# Patient Record
Sex: MALE | Race: OTHER | NOT HISPANIC OR LATINO | URBAN - METROPOLITAN AREA
[De-identification: names, ages, dates, MRNs, and addresses within clinical notes are randomized per-mention and may not be internally consistent; named-entity substitution may affect disease eponyms.]

---

## 2022-10-24 ENCOUNTER — EMERGENCY (EMERGENCY)
Facility: HOSPITAL | Age: 62
LOS: 1 days | Discharge: HOME | End: 2022-10-24
Attending: STUDENT IN AN ORGANIZED HEALTH CARE EDUCATION/TRAINING PROGRAM | Admitting: STUDENT IN AN ORGANIZED HEALTH CARE EDUCATION/TRAINING PROGRAM
Payer: COMMERCIAL

## 2022-10-24 VITALS
HEART RATE: 82 BPM | TEMPERATURE: 98 F | WEIGHT: 197.98 LBS | RESPIRATION RATE: 18 BRPM | HEIGHT: 69 IN | SYSTOLIC BLOOD PRESSURE: 146 MMHG | DIASTOLIC BLOOD PRESSURE: 89 MMHG | OXYGEN SATURATION: 99 %

## 2022-10-24 PROCEDURE — 99283 EMERGENCY DEPT VISIT LOW MDM: CPT

## 2022-10-24 RX ORDER — FAMOTIDINE 10 MG/ML
20 INJECTION INTRAVENOUS ONCE
Refills: 0 | Status: DISCONTINUED | OUTPATIENT
Start: 2022-10-24 | End: 2022-10-24

## 2022-10-24 RX ORDER — FAMOTIDINE 10 MG/ML
20 INJECTION INTRAVENOUS ONCE
Refills: 0 | Status: COMPLETED | OUTPATIENT
Start: 2022-10-24 | End: 2022-10-24

## 2022-10-24 RX ORDER — FAMOTIDINE 10 MG/ML
1 INJECTION INTRAVENOUS
Qty: 30 | Refills: 0
Start: 2022-10-24

## 2022-10-24 RX ADMIN — Medication 30 MILLILITER(S): at 12:27

## 2022-10-24 RX ADMIN — FAMOTIDINE 20 MILLIGRAM(S): 10 INJECTION INTRAVENOUS at 12:30

## 2022-10-24 NOTE — ED PROVIDER NOTE - NS ED ROS FT
CONSTITUTIONAL: No fever, no chills, no fatigue  EYES: No eye redness, no visual changes  ENT: No ear pain, no sore throat  CARDIOVASCULAR: No chest pain, no palpitations  RESPIRATORY: No cough, no SOB  GI: +abdominal pain, + reflux, no nausea, no vomiting, no constipation, no diarrhea  GENITOURINARY: No dysuria, no frequency, no hematuria  MUSCULOSKELETAL: No back pain, no joint pain, no myalgias  SKIN: No rash, no peripheral edema  NEURO: No headache, no confusion    ALL OTHER SYSTEMS NEGATIVE.

## 2022-10-24 NOTE — ED ADULT TRIAGE NOTE - CHIEF COMPLAINT QUOTE
Pt c/o burning epigastric pain after eating spicy food yesterday. Denies fevers, chills, n/v/d, chest pain.

## 2022-10-24 NOTE — ED PROVIDER NOTE - OBJECTIVE STATEMENT
62 yo M w PMH HTN, GERD, p/w burning epigastric pain yesterday, now resolved. Pt states he ate spicy food 2 days ago, yesterday had heartburn symptoms. States he has had similar symptoms in the past and is only here for medication recommendations. He states he does not want blood tests, imaging, or EKG. He refuses any further w/u. No cp, sob, nausea, vomiting, dizziness, fever, changes to stool, or urinary symptoms.

## 2022-10-24 NOTE — ED ADULT NURSE NOTE - PRIMARY CARE PROVIDER
Refill Authorization Note   Tonya Cohn  is requesting a refill authorization.  Brief Assessment and Rationale for Refill:  Approve     Medication Therapy Plan:       Medication Reconciliation Completed: No   Comments:     Provider Staff:      Action is required for this patient.   Please see care gap opportunities below in Care Due Message.      Thanks!  [facility]     Note composed:8:32 AM 05/19/2022             non-affiliated

## 2022-10-24 NOTE — ED PROVIDER NOTE - NSFOLLOWUPINSTRUCTIONS_ED_ALL_ED_FT
Follow up with your primary medical doctor as soon as possible.  Return to the emergency department if your symptoms worsen or if you develop new symptoms.    Your symptoms can be managed with famotidine - take as prescribed.    Gastroesophageal Reflux Disease, Adult       Gastroesophageal reflux (RONAL) happens when acid from the stomach flows up into the tube that connects the mouth and the stomach (esophagus). Normally, food travels down the esophagus and stays in the stomach to be digested. However, when a person has RONAL, food and stomach acid sometimes move back up into the esophagus. If this becomes a more serious problem, the person may be diagnosed with a disease called gastroesophageal reflux disease (GERD). GERD occurs when the reflux:  •Happens often.       •Causes frequent or severe symptoms.       •Causes problems such as damage to the esophagus.      When stomach acid comes in contact with the esophagus, the acid may cause inflammation in the esophagus. Over time, GERD may create small holes (ulcers) in the lining of the esophagus.      What are the causes?    This condition is caused by a problem with the muscle between the esophagus and the stomach (lower esophageal sphincter, or LES). Normally, the LES muscle closes after food passes through the esophagus to the stomach. When the LES is weakened or abnormal, it does not close properly, and that allows food and stomach acid to go back up into the esophagus.    The LES can be weakened by certain dietary substances, medicines, and medical conditions, including:  •Tobacco use.      •Pregnancy.      •Having a hiatal hernia.      •Alcohol use.      •Certain foods and beverages, such as coffee, chocolate, onions, and peppermint.        What increases the risk?    You are more likely to develop this condition if you:  •Have an increased body weight.      •Have a connective tissue disorder.      •Take NSAIDs, such as ibuprofen.        What are the signs or symptoms?    Symptoms of this condition include:  •Heartburn.      •Difficult or painful swallowing and the feeling of having a lump in the throat.      •A bitter taste in the mouth.      •Bad breath and having a large amount of saliva.      •Having an upset or bloated stomach and belching.      •Chest pain. Different conditions can cause chest pain. Make sure you see your health care provider if you experience chest pain.      •Shortness of breath or wheezing.      •Ongoing (chronic) cough or a nighttime cough.      •Wearing away of tooth enamel.      •Weight loss.        How is this diagnosed?    This condition may be diagnosed based on a medical history and a physical exam. To determine if you have mild or severe GERD, your health care provider may also monitor how you respond to treatment. You may also have tests, including:  •A test to examine your stomach and esophagus with a small camera (endoscopy).      •A test that measures the acidity level in your esophagus.      •A test that measures how much pressure is on your esophagus.      •A barium swallow or modified barium swallow test to show the shape, size, and functioning of your esophagus.        How is this treated?    Treatment for this condition may vary depending on how severe your symptoms are. Your health care provider may recommend:  •Changes to your diet.      •Medicine.      •Surgery.      The goal of treatment is to help relieve your symptoms and to prevent complications.      Follow these instructions at home:      Eating and drinking    •Follow a diet as recommended by your health care provider. This may involve avoiding foods and drinks such as:  •Coffee and tea, with or without caffeine.      •Drinks that contain alcohol.      •Energy drinks and sports drinks.      •Carbonated drinks or sodas.      •Chocolate and cocoa.      •Peppermint and mint flavorings.      •Garlic and onions.      •Horseradish.      •Spicy and acidic foods, including peppers, chili powder, rowland powder, vinegar, hot sauces, and barbecue sauce.      •Citrus fruit juices and citrus fruits, such as oranges, antonia, and limes.      •Tomato-based foods, such as red sauce, chili, salsa, and pizza with red sauce.      •Fried and fatty foods, such as donuts, french fries, potato chips, and high-fat dressings.      •High-fat meats, such as hot dogs and fatty cuts of red and white meats, such as rib eye steak, sausage, ham, and acosta.      •High-fat dairy items, such as whole milk, butter, and cream cheese.        •Eat small, frequent meals instead of large meals.      •Avoid drinking large amounts of liquid with your meals.      •Avoid eating meals during the 2–3 hours before bedtime.      •Avoid lying down right after you eat.      • Do not exercise right after you eat.        Lifestyle      • Do not use any products that contain nicotine or tobacco. These products include cigarettes, chewing tobacco, and vaping devices, such as e-cigarettes. If you need help quitting, ask your health care provider.      •Try to reduce your stress by using methods such as yoga or meditation. If you need help reducing stress, ask your health care provider.      •If you are overweight, reduce your weight to an amount that is healthy for you. Ask your health care provider for guidance about a safe weight loss goal.      General instructions     •Pay attention to any changes in your symptoms.      •Take over-the-counter and prescription medicines only as told by your health care provider. Do not take aspirin, ibuprofen, or other NSAIDs unless your health care provider told you to take these medicines.      •Wear loose-fitting clothing. Do not wear anything tight around your waist that causes pressure on your abdomen.      •Raise (elevate) the head of your bed about 6 inches (15 cm). You can use a wedge to do this.      •Avoid bending over if this makes your symptoms worse.      •Keep all follow-up visits. This is important.        Contact a health care provider if:  •You have:  •New symptoms.      •Unexplained weight loss.      •Difficulty swallowing or it hurts to swallow.      •Wheezing or a persistent cough.      •A hoarse voice.        •Your symptoms do not improve with treatment.        Get help right away if:    •You have sudden pain in your arms, neck, jaw, teeth, or back.      •You suddenly feel sweaty, dizzy, or light-headed.      •You have chest pain or shortness of breath.      •You vomit and the vomit is green, yellow, or black, or it looks like blood or coffee grounds.      •You faint.      •You have stool that is red, bloody, or black.      •You cannot swallow, drink, or eat.      These symptoms may represent a serious problem that is an emergency. Do not wait to see if the symptoms will go away. Get medical help right away. Call your local emergency services (911 in the [place] Do not drive yourself to the hospital.       Summary    •Gastroesophageal reflux happens when acid from the stomach flows up into the esophagus. GERD is a disease in which the reflux happens often, causes frequent or severe symptoms, or causes problems such as damage to the esophagus.      •Treatment for this condition may vary depending on how severe your symptoms are. Your health care provider may recommend diet and lifestyle changes, medicine, or surgery.      •Contact a health care provider if you have new or worsening symptoms.      •Take over-the-counter and prescription medicines only as told by your health care provider. Do not take aspirin, ibuprofen, or other NSAIDs unless your health care provider told you to do so.      •Keep all follow-up visits as told by your health care provider. This is important.      This information is not intended to replace advice given to you by your health care provider. Make sure you discuss any questions you have with your health care provider.

## 2022-10-24 NOTE — ED PROVIDER NOTE - CLINICAL SUMMARY MEDICAL DECISION MAKING FREE TEXT BOX
Most likely GERD. No current pain. Pt refusing labs, ekg. Only wants medication.  He would like recommendation on GERD medicines which were given to pt.  He states he would like to leave the ED.  Explained to pt that without further testing we will no be able to know if there is underlying undiagnosed pathology - pt understands and does not want additional w/u. He understands that risk includes worsening disease and potentially death. Most likely GERD. No current pain. Pt refusing labs, ekg. Only wants medication.  He would like recommendation on GERD medicines which were given to pt.  Given History and Exam there does not appear to be an emergent cause of the symptoms such as small bowel obstruction, coronary syndrome (no history of chest pain or significant family history), bowel ischemia, DKA, pancreatitis appendicitis, other acute abdomen or other emergent problem.  He states he would like to leave the ED.  Explained to pt that without further testing we will no be able to know if there is underlying undiagnosed pathology - pt understands and does not want additional w/u. He understands that risk includes worsening disease and potentially death.

## 2022-10-24 NOTE — ED ADULT NURSE NOTE - OBJECTIVE STATEMENT
Pt presents to ED C/O burning epigastric pain S/P eating spicy food yesterday. Pt denies N/V/D. States, " I have a history of excess acid, the pain is a little better today, I think I need a prescription for medicine". Denies cardiac hx.

## 2022-10-24 NOTE — ED PROVIDER NOTE - PATIENT PORTAL LINK FT
You can access the FollowMyHealth Patient Portal offered by [facility] by registering at the following website: [email or web address]. By joining [email or web address]’s FollowMyHealth portal, you will also be able to view your health information using other applications (apps) compatible with our system.

## 2022-10-26 DIAGNOSIS — R10.13 EPIGASTRIC PAIN: ICD-10-CM

## 2022-10-26 DIAGNOSIS — K21.9 GASTRO-ESOPHAGEAL REFLUX DISEASE WITHOUT ESOPHAGITIS: ICD-10-CM

## 2022-10-26 DIAGNOSIS — I10 ESSENTIAL (PRIMARY) HYPERTENSION: ICD-10-CM

## 2024-10-07 NOTE — ED ADULT NURSE NOTE - CHIEF COMPLAINT
There is another encounter same day that this was taken care of   The patient is a 61y Male complaining of abdominal pain.
